# Patient Record
Sex: FEMALE | Race: WHITE | Employment: FULL TIME | URBAN - METROPOLITAN AREA
[De-identification: names, ages, dates, MRNs, and addresses within clinical notes are randomized per-mention and may not be internally consistent; named-entity substitution may affect disease eponyms.]

---

## 2017-01-10 ENCOUNTER — OFFICE VISIT (OUTPATIENT)
Dept: FAMILY MEDICINE CLINIC | Facility: CLINIC | Age: 32
End: 2017-01-10

## 2017-01-10 VITALS
HEART RATE: 82 BPM | DIASTOLIC BLOOD PRESSURE: 68 MMHG | RESPIRATION RATE: 14 BRPM | SYSTOLIC BLOOD PRESSURE: 118 MMHG | TEMPERATURE: 98 F | OXYGEN SATURATION: 98 %

## 2017-01-10 DIAGNOSIS — H93.11 TINNITUS, RIGHT: ICD-10-CM

## 2017-01-10 DIAGNOSIS — H65.191 ACUTE MIDDLE EAR EFFUSION, RIGHT: Primary | ICD-10-CM

## 2017-01-10 PROCEDURE — 99213 OFFICE O/P EST LOW 20 MIN: CPT | Performed by: PHYSICIAN ASSISTANT

## 2017-01-10 RX ORDER — FLUTICASONE PROPIONATE 50 MCG
SPRAY, SUSPENSION (ML) NASAL
Qty: 1 BOTTLE | Refills: 1 | Status: SHIPPED | OUTPATIENT
Start: 2017-01-10 | End: 2017-11-20 | Stop reason: ALTCHOICE

## 2017-01-11 NOTE — PATIENT INSTRUCTIONS
Eustachian tube problems  Written by the doctors and editors at [facility]     What is the eustachian tube? — The eustachian tube is a tube that connects the middle ear (the part of the ear behind the eardrum) to the back of the nose and throat       Normall Should I see a doctor or nurse? — See your doctor or nurse if your symptoms are severe, get worse, or if they don’t go away after a few days. Will I need tests? — Probably not.  Your doctor or nurse should be able to tell if you have a eustachian tube pr

## 2017-01-11 NOTE — PROGRESS NOTES
CHIEF COMPLAINT:   Patient presents with:  Ear Problem: muffled hearing, right ear      HPI:   Aki Rosado is a 33year old female who presents to clinic today with complaints of right ear discomfort. Has had for 1-2  weeks.  Discomfort described /68 mmHg  Pulse 82  Temp(Src) 98.2 °F (36.8 °C) (Oral)  Resp 14  SpO2 98%  LMP 12/27/2016 (Approximate)  GENERAL: well developed, well nourished,in no apparent distress  SKIN: no rashes,no suspicious lesions  HEAD: atraumatic, normocephalic  EYES: co What is the eustachian tube? — The eustachian tube is a tube that connects the middle ear (the part of the ear behind the eardrum) to the back of the nose and throat       Normally, the eustachian tube helps keep the air pressure inside the middle ear the Will I need tests? — Probably not.  Your doctor or nurse should be able to tell if you have a eustachian tube problem by learning about your symptoms and doing an exam.    If your symptoms are severe or last for a long time, your doctor or nurse might:    ?

## 2017-02-04 ENCOUNTER — OFFICE VISIT (OUTPATIENT)
Dept: OBGYN CLINIC | Facility: CLINIC | Age: 32
End: 2017-02-04

## 2017-02-04 VITALS
DIASTOLIC BLOOD PRESSURE: 60 MMHG | HEIGHT: 63.25 IN | HEART RATE: 81 BPM | SYSTOLIC BLOOD PRESSURE: 120 MMHG | WEIGHT: 158 LBS | BODY MASS INDEX: 27.65 KG/M2

## 2017-02-04 DIAGNOSIS — Z12.4 SCREENING FOR MALIGNANT NEOPLASM OF CERVIX: ICD-10-CM

## 2017-02-04 DIAGNOSIS — Z01.419 WELL FEMALE EXAM WITH ROUTINE GYNECOLOGICAL EXAM: Primary | ICD-10-CM

## 2017-02-04 PROCEDURE — 88175 CYTOPATH C/V AUTO FLUID REDO: CPT | Performed by: OBSTETRICS & GYNECOLOGY

## 2017-02-04 PROCEDURE — 99395 PREV VISIT EST AGE 18-39: CPT | Performed by: OBSTETRICS & GYNECOLOGY

## 2017-02-04 NOTE — PROGRESS NOTES
Annual  C/O some abdominal pain, mid cycle or so, intermittent  Some months worse than others, tolerable, does not last long  LMP 1/19/17, not sexually active.  Regular cycles  Had scan in 1675, small follicles, does not like being on OC    ROS: No Cardiac,

## 2017-02-07 LAB — PAP HISTORY (OTHER THAN LAST PAP): NORMAL

## 2017-02-07 NOTE — PROGRESS NOTES
Quick Note:    Left message on patients confidential voice mail Pap results are normal. To call back with any further concerns or questions.   ______

## 2017-08-18 ENCOUNTER — APPOINTMENT (OUTPATIENT)
Dept: OTHER | Facility: HOSPITAL | Age: 32
End: 2017-08-18
Attending: ORTHOPAEDIC SURGERY

## 2017-08-28 ENCOUNTER — TELEPHONE (OUTPATIENT)
Dept: FAMILY MEDICINE CLINIC | Facility: CLINIC | Age: 32
End: 2017-08-28

## 2017-08-28 NOTE — TELEPHONE ENCOUNTER
Attempted to contact pt to schedule appt, no answer, left message to contact the office. Forwarded to front staff in case pt calls back, since pt needs to schedule a follow up visit due to being overdue. No future appointments.     LOV: 3/19/16  Retur

## 2017-08-31 NOTE — TELEPHONE ENCOUNTER
Left detailed message on voicemail for patient to call the office and make an appt to follow up on her asthma, even if she is not having any asthma issues right now.

## 2017-11-20 ENCOUNTER — HOSPITAL ENCOUNTER (OUTPATIENT)
Age: 32
Discharge: HOME | End: 2017-11-20
Attending: EMERGENCY MEDICINE
Payer: COMMERCIAL

## 2017-11-20 VITALS
RESPIRATION RATE: 20 BRPM | BODY MASS INDEX: 27 KG/M2 | HEART RATE: 90 BPM | OXYGEN SATURATION: 98 % | DIASTOLIC BLOOD PRESSURE: 67 MMHG | WEIGHT: 155 LBS | TEMPERATURE: 99 F | SYSTOLIC BLOOD PRESSURE: 121 MMHG

## 2017-11-20 DIAGNOSIS — J45.901 MILD ASTHMA WITH EXACERBATION, UNSPECIFIED WHETHER PERSISTENT: ICD-10-CM

## 2017-11-20 DIAGNOSIS — J01.90 ACUTE SINUSITIS, RECURRENCE NOT SPECIFIED, UNSPECIFIED LOCATION: Primary | ICD-10-CM

## 2017-11-20 PROCEDURE — 99213 OFFICE O/P EST LOW 20 MIN: CPT

## 2017-11-20 PROCEDURE — 99214 OFFICE O/P EST MOD 30 MIN: CPT

## 2017-11-20 RX ORDER — ALBUTEROL SULFATE 90 UG/1
2 AEROSOL, METERED RESPIRATORY (INHALATION) EVERY 4 HOURS PRN
Qty: 1 INHALER | Refills: 0 | Status: SHIPPED | OUTPATIENT
Start: 2017-11-20 | End: 2017-12-20

## 2017-11-20 RX ORDER — PREDNISONE 20 MG/1
40 TABLET ORAL DAILY
Qty: 10 TABLET | Refills: 0 | Status: SHIPPED | OUTPATIENT
Start: 2017-11-20 | End: 2017-11-25

## 2017-11-20 RX ORDER — AMOXICILLIN AND CLAVULANATE POTASSIUM 875; 125 MG/1; MG/1
1 TABLET, FILM COATED ORAL 2 TIMES DAILY
Qty: 14 TABLET | Refills: 0 | Status: SHIPPED | OUTPATIENT
Start: 2017-11-20 | End: 2017-11-27

## 2017-11-20 NOTE — ED PROVIDER NOTES
Patient Seen in: [facility] In [address] [place] [address]    History   Patient presents with:  Cough/URI    Stated Complaint: cough     HPI    Patient is a 70-year-old female with a history of asthma who states she has had some sinus congestion and p Exam   ED Triage Vitals [11/20/17 9600]  BP: 121/67  Pulse: 90  Resp: 20  Temp: 98.9 °F (37.2 °C)  Temp src: Oral  SpO2: 98 %  O2 Device: None (Room air)    Current:/67   Pulse 90   Temp 98.9 °F (37.2 °C) (Oral)   Resp 20   Wt 70.3 kg   SpO2 98%   BM Differential Diagnosis/ Diagnostic Considerations: URI symptoms for greater than 1 month complicating her asthma. No evidence of wheezing presently and no hypoxia.     Medical Record Review: I personally reviewed available prior medical records for any

## 2017-12-08 ENCOUNTER — TELEPHONE (OUTPATIENT)
Dept: FAMILY MEDICINE CLINIC | Facility: CLINIC | Age: 32
End: 2017-12-08

## 2017-12-08 NOTE — TELEPHONE ENCOUNTER
Spoke to patient and reschedule for     Future Appointments  Date Time Provider [facility]   12/9/2017 10:30 AM Padmini Rodríguez,  [facility] [address] [facility] [address]

## 2017-12-09 ENCOUNTER — OFFICE VISIT (OUTPATIENT)
Dept: FAMILY MEDICINE CLINIC | Facility: CLINIC | Age: 32
End: 2017-12-09

## 2017-12-09 VITALS
RESPIRATION RATE: 16 BRPM | TEMPERATURE: 98 F | HEART RATE: 85 BPM | WEIGHT: 159.38 LBS | DIASTOLIC BLOOD PRESSURE: 60 MMHG | SYSTOLIC BLOOD PRESSURE: 110 MMHG | BODY MASS INDEX: 27.89 KG/M2 | OXYGEN SATURATION: 100 % | HEIGHT: 63.25 IN

## 2017-12-09 DIAGNOSIS — Z28.21 INFLUENZA VACCINATION DECLINED BY PATIENT: ICD-10-CM

## 2017-12-09 DIAGNOSIS — J45.41 MODERATE PERSISTENT ASTHMA WITH ACUTE EXACERBATION: Primary | ICD-10-CM

## 2017-12-09 PROCEDURE — 99214 OFFICE O/P EST MOD 30 MIN: CPT | Performed by: FAMILY MEDICINE

## 2017-12-09 NOTE — PATIENT INSTRUCTIONS
Asthma Trigger Checklist  Allergens, irritants, and other things may trigger your asthma. Check the box next to each of your triggers. After each trigger is a list of ways to avoid it.     Dust mites.  Dust mites live in mattresses, bedding, carpets, hank · Remove garbage from your home daily. · Store food in tightly sealed containers. Wash dishes as soon as they are used. · Use bait stations or traps to control roaches. Avoid using chemical sprays.      Smoke.  Smoke may be from cigarettes, cigars, pipes, · Watch for very high or low temperatures, very humid conditions, or a lot of wind, as these conditions can make symptoms worse. · Limit outdoor activity during the type of weather that affects you.   · Wear a scarf over your mouth and nose in cold weather · Stop if you have any symptoms. Make sure you talk with your provider about these symptoms. Date Last Reviewed: 1/1/2017  © 9810-9490 [employer]. [address] [facility], [address] [place] [address]. All rights reserved.  This information is not inten

## 2017-12-09 NOTE — PROGRESS NOTES
HPI:   Jaret Guido is a 27year old female that presents for asthma follow up. Previously on albuterol only and used <1x per week or prior to exercise. Lately has needed rescue inhaler 2-3 x per day. Waking up nightly coughing.   She ha Grossly normal     ASSESSMENT AND PLAN:      1. Moderate persistent asthma with acute exacerbation  - Fluticasone Propionate HFA (FLOVENT HFA) 110 MCG/ACT Inhalation Aerosol; Inhale 2 puffs into the lungs 2 (two) times daily. Dispense: 1 Inhaler;  Refill:

## 2018-01-11 ENCOUNTER — TELEPHONE (OUTPATIENT)
Dept: FAMILY MEDICINE CLINIC | Facility: CLINIC | Age: 33
End: 2018-01-11

## 2018-01-11 NOTE — TELEPHONE ENCOUNTER
Patient was a no show for her appt today with Dr. Kiana Coughlin. Spoke to patient who states that her grandmother is in the hospital, and that she tried to call office this morning (prior to 830). Patient will call back to reschedule.

## 2019-02-28 ENCOUNTER — TELEPHONE (OUTPATIENT)
Dept: OBGYN CLINIC | Facility: CLINIC | Age: 34
End: 2019-02-28

## 2019-02-28 DIAGNOSIS — Z12.39 SCREENING FOR MALIGNANT NEOPLASM OF BREAST: Primary | ICD-10-CM

## 2019-02-28 DIAGNOSIS — Z80.3 FAMILY HISTORY OF MALIGNANT NEOPLASM OF BREAST: ICD-10-CM

## 2019-02-28 NOTE — TELEPHONE ENCOUNTER
Patient wants to get a mammogram ordered due to history. Can we put an order in before her Annual which is on March 25, 2019. Call patient and let her know.

## 2019-02-28 NOTE — TELEPHONE ENCOUNTER
Last OV: 2/4/17 with Dr. Armando Lei for annual  Last mammogram: none on file; patient is 26 yrs old. Family hx (MGM with breast CA)  Next appt: 3/25/19    Will route to Dr. Armando Lei for review and advice.    Notified patient that Dr. Armando Lei is out of office at present,

## 2019-03-11 NOTE — TELEPHONE ENCOUNTER
Dixie Lao MD   You; [facility] [place] Nurse 17 minutes ago (12:42 PM)      OK for mammogram if patient desires    Routing Comment      Screening mammogram ordered. Patient notified by phone.

## 2019-05-22 ENCOUNTER — TELEPHONE (OUTPATIENT)
Dept: FAMILY MEDICINE CLINIC | Facility: CLINIC | Age: 34
End: 2019-05-22

## 2019-05-22 DIAGNOSIS — E55.9 VITAMIN D DEFICIENCY: Primary | ICD-10-CM

## 2019-05-22 DIAGNOSIS — Z00.00 LABORATORY EXAMINATION ORDERED AS PART OF A ROUTINE GENERAL MEDICAL EXAMINATION: ICD-10-CM

## 2019-05-22 NOTE — TELEPHONE ENCOUNTER
Future Appointments   Date Time Provider [facility] Christian   5/30/2019 11:30 AM Valerio Rodríguez, DO [facility] [address] [facility] [address]   6/3/2019  5:00 PM DWAINE Casey [facility]     Patient has been notified via [employer] reminder her to have her

## 2019-06-07 ENCOUNTER — LAB ENCOUNTER (OUTPATIENT)
Dept: LAB | Age: 34
End: 2019-06-07
Attending: FAMILY MEDICINE
Payer: COMMERCIAL

## 2019-06-07 ENCOUNTER — OFFICE VISIT (OUTPATIENT)
Dept: FAMILY MEDICINE CLINIC | Facility: CLINIC | Age: 34
End: 2019-06-07
Payer: COMMERCIAL

## 2019-06-07 VITALS
DIASTOLIC BLOOD PRESSURE: 60 MMHG | TEMPERATURE: 99 F | WEIGHT: 143 LBS | OXYGEN SATURATION: 100 % | HEIGHT: 63.25 IN | HEART RATE: 71 BPM | BODY MASS INDEX: 25.02 KG/M2 | RESPIRATION RATE: 16 BRPM | SYSTOLIC BLOOD PRESSURE: 100 MMHG

## 2019-06-07 DIAGNOSIS — Z00.00 LABORATORY EXAMINATION ORDERED AS PART OF A ROUTINE GENERAL MEDICAL EXAMINATION: ICD-10-CM

## 2019-06-07 DIAGNOSIS — H91.91 DECREASED HEARING, RIGHT: ICD-10-CM

## 2019-06-07 DIAGNOSIS — J45.20 MILD INTERMITTENT ASTHMA WITHOUT COMPLICATION: ICD-10-CM

## 2019-06-07 DIAGNOSIS — Z00.00 ANNUAL PHYSICAL EXAM: Primary | ICD-10-CM

## 2019-06-07 DIAGNOSIS — Z13.31 NEGATIVE DEPRESSION SCREENING: ICD-10-CM

## 2019-06-07 DIAGNOSIS — E55.9 VITAMIN D DEFICIENCY: ICD-10-CM

## 2019-06-07 PROCEDURE — 84443 ASSAY THYROID STIM HORMONE: CPT

## 2019-06-07 PROCEDURE — 80061 LIPID PANEL: CPT

## 2019-06-07 PROCEDURE — 99395 PREV VISIT EST AGE 18-39: CPT | Performed by: FAMILY MEDICINE

## 2019-06-07 PROCEDURE — 36415 COLL VENOUS BLD VENIPUNCTURE: CPT

## 2019-06-07 PROCEDURE — 80053 COMPREHEN METABOLIC PANEL: CPT

## 2019-06-07 PROCEDURE — 85025 COMPLETE CBC W/AUTO DIFF WBC: CPT

## 2019-06-07 PROCEDURE — 82306 VITAMIN D 25 HYDROXY: CPT

## 2019-06-07 RX ORDER — ALBUTEROL SULFATE 90 UG/1
2 AEROSOL, METERED RESPIRATORY (INHALATION) EVERY 6 HOURS PRN
COMMUNITY

## 2019-06-07 NOTE — PATIENT INSTRUCTIONS
Thank you for allowing me to participate in your care today. I will contact you with any results from today's visit. Lab results are typically available in 2-3 days for blood tests, and 3-5 days for any cultures or Paps.    Please let me know if you hav prediabetes All women with no symptoms who are overweight or obese and have 1 or more other risk factors for diabetes At least every 3 years.  Also, testing for diabetes during pregnancy after the 24th week.    Type 2 diabetes, prediabetes All women diagnos months after the first dose and the third dose given 6 months after the first dose   Influenza (flu) All women in this age group Once a year   Measles, mumps, rubella (MMR) All women in this age group who have no record of these infections or vaccines 1 or not up-to-date on their childhood vaccines should get all appropriate catch-up vaccines recommended by the CDC. Date Last Reviewed: 10/1/2017  © 2360-2288 [employer]. 1407 [facility], [facility] [place]. All rights reserved.  This info

## 2019-06-07 NOTE — PROGRESS NOTES
Estrella Ramirez is a 26year old female that presents for annual physical exam.     Last Pap: 2/4/17 -> WNL - Dr. Dina Wu  Hx of abnormal pap: Yes  STI testing desired: No  Mammogram: N/A  Colonoscopy: N/A  PHQ2: 0  Vaccines: declines vaccine  D 2007   • Hip surgery  12/2013    fractured pelvis in 3 spots   • Knee surgery  2002    reconstructive after accident   • Other surgical history      rt. hip fracture     Family History   Problem Relation Age of Onset   • High Cholesterol Father    • Diabet Blood Transfusions: Not Asked        Caffeine Concern: Yes          3 weekly        Occupational Exposure: Not Asked        Hobby Hazards: Not Asked        Sleep Concern: Not Asked        Stress Concern: Not Asked        Weight Concern: Not Asked        S clubbing or LE edema  NEURO: Oriented times three, gait stable, motor and sensory are grossly intact      Wt Readings from Last 6 Encounters:  06/07/19 : 143 lb  12/09/17 : 159 lb 6 oz  11/20/17 : 155 lb  02/04/17 : 158 lb  03/19/16 : 154 lb  02/09/16 : 15

## 2019-09-05 ENCOUNTER — HOSPITAL ENCOUNTER (OUTPATIENT)
Dept: MAMMOGRAPHY | Age: 34
Discharge: HOME | End: 2019-09-05
Attending: OBSTETRICS & GYNECOLOGY
Payer: COMMERCIAL

## 2019-09-05 DIAGNOSIS — Z80.3 FAMILY HISTORY OF MALIGNANT NEOPLASM OF BREAST: ICD-10-CM

## 2019-09-05 DIAGNOSIS — Z12.39 SCREENING FOR MALIGNANT NEOPLASM OF BREAST: ICD-10-CM

## 2019-09-05 PROCEDURE — 77063 BREAST TOMOSYNTHESIS BI: CPT | Performed by: OBSTETRICS & GYNECOLOGY

## 2019-09-05 PROCEDURE — 77067 SCR MAMMO BI INCL CAD: CPT | Performed by: OBSTETRICS & GYNECOLOGY

## 2019-09-09 ENCOUNTER — TELEPHONE (OUTPATIENT)
Dept: OBGYN CLINIC | Facility: CLINIC | Age: 34
End: 2019-09-09

## 2019-09-09 NOTE — TELEPHONE ENCOUNTER
Patient informed of normal mammogram results. Verbalized understanding. No further questions or concerns.

## 2019-09-09 NOTE — TELEPHONE ENCOUNTER
Routed to Tory Lundberg. Please review patient mammogram results and advise. Pt last saw Dr. Michelle Garber in 2017 and has appt with [facility] next week.

## 2019-09-16 ENCOUNTER — OFFICE VISIT (OUTPATIENT)
Dept: OBGYN CLINIC | Facility: CLINIC | Age: 34
End: 2019-09-16
Payer: COMMERCIAL

## 2019-09-16 VITALS
HEIGHT: 63.25 IN | SYSTOLIC BLOOD PRESSURE: 94 MMHG | BODY MASS INDEX: 25.52 KG/M2 | WEIGHT: 145.81 LBS | HEART RATE: 81 BPM | DIASTOLIC BLOOD PRESSURE: 60 MMHG

## 2019-09-16 DIAGNOSIS — Z01.419 WELL WOMAN EXAM WITH ROUTINE GYNECOLOGICAL EXAM: Primary | ICD-10-CM

## 2019-09-16 DIAGNOSIS — Z12.39 SCREENING FOR MALIGNANT NEOPLASM OF BREAST: ICD-10-CM

## 2019-09-16 DIAGNOSIS — N89.8 VAGINAL ODOR: ICD-10-CM

## 2019-09-16 PROCEDURE — 87480 CANDIDA DNA DIR PROBE: CPT | Performed by: NURSE PRACTITIONER

## 2019-09-16 PROCEDURE — 87660 TRICHOMONAS VAGIN DIR PROBE: CPT | Performed by: NURSE PRACTITIONER

## 2019-09-16 PROCEDURE — 87510 GARDNER VAG DNA DIR PROBE: CPT | Performed by: NURSE PRACTITIONER

## 2019-09-16 PROCEDURE — 99395 PREV VISIT EST AGE 18-39: CPT | Performed by: NURSE PRACTITIONER

## 2019-09-16 NOTE — PROGRESS NOTES
HPI:   Sandi Peguero is a 26year old  who presents for an annual gynecological exam.   Pt feels like she has a \"bad\" vaginal odor present. Denies using douches or feminine wash, but still the odor is present.  Denies abn discharge Cancer Maternal Aunt       Social History:   Social History    Tobacco Use      Smoking status: Never Smoker      Smokeless tobacco: Never Used    Alcohol use: No      Alcohol/week: 0.0 standard drinks      Comment: socially    Drug use: No       REVIEW OF appropriate    DISCUSSED:  - I encouraged incorporating 4x weekly cardiovascular activity to maintain good physical health. - We discussed maintaining a healthy diet heavy in fruits and vegetables.   - I encouraged the pt to stay away from fast foods and f

## 2020-02-24 ENCOUNTER — TELEPHONE (OUTPATIENT)
Dept: OBGYN CLINIC | Facility: CLINIC | Age: 35
End: 2020-02-24

## 2020-02-24 RX ORDER — ALBUTEROL SULFATE 90 UG/1
2 AEROSOL, METERED RESPIRATORY (INHALATION) EVERY 4 HOURS PRN
Qty: 1 INHALER | Refills: 0 | Status: SHIPPED | OUTPATIENT
Start: 2020-02-24

## 2020-02-24 NOTE — TELEPHONE ENCOUNTER
PT relates that her asthma hasnt acted up in a long time but now it is and she does not have a PCP. Would it be possible to have [facility] call one in for her.   Please advise

## 2020-02-24 NOTE — TELEPHONE ENCOUNTER
PC with patient. Aware provider to do 1 refill on inhaler. If having shortness of breathe, chest pain or inability to take deep breaths, she needs to got to the ER. Asked if she wants names for pcp She states she will check into herself.

## 2020-02-24 NOTE — TELEPHONE ENCOUNTER
PC with patient. Having some trouble with her asthma. Some difficulties with breathing due to asthma. Her inhaler has  and she is asking for a refill. Told she should get a pcp to follow her on this. I will check with provider.

## 2020-02-24 NOTE — TELEPHONE ENCOUNTER
Okay for 1 inhaler, but needs to find a PCP. Can recommend some if she'd like. If SOB, chest pain or inability to take deep breaths, need to go directly to ED.

## 2020-10-05 ENCOUNTER — TELEPHONE (OUTPATIENT)
Dept: OBGYN CLINIC | Facility: CLINIC | Age: 35
End: 2020-10-05

## 2020-10-05 DIAGNOSIS — R92.2 DENSE BREAST: Primary | ICD-10-CM

## 2020-10-05 NOTE — TELEPHONE ENCOUNTER
Pt last annual 9/16/19. Pt has mammogram scheduled for tomorrow; requesting bilateral whole breast US. This was ordered in 2019 by Dr. Antoni Harmon per mammography recommendations but patient did not complete. US ordered.     Pt reports itching in right axillae

## 2020-10-06 ENCOUNTER — HOSPITAL ENCOUNTER (OUTPATIENT)
Dept: MAMMOGRAPHY | Facility: HOSPITAL | Age: 35
Discharge: HOME | End: 2020-10-06
Attending: OBSTETRICS & GYNECOLOGY
Payer: COMMERCIAL

## 2020-10-06 DIAGNOSIS — Z12.31 ENCOUNTER FOR SCREENING MAMMOGRAM FOR MALIGNANT NEOPLASM OF BREAST: ICD-10-CM

## 2020-10-06 PROCEDURE — 77067 SCR MAMMO BI INCL CAD: CPT | Performed by: OBSTETRICS & GYNECOLOGY

## 2020-10-06 PROCEDURE — 77063 BREAST TOMOSYNTHESIS BI: CPT | Performed by: OBSTETRICS & GYNECOLOGY

## 2020-11-17 ENCOUNTER — OFFICE VISIT (OUTPATIENT)
Dept: OBGYN CLINIC | Facility: CLINIC | Age: 35
End: 2020-11-17
Payer: COMMERCIAL

## 2020-11-17 VITALS
BODY MASS INDEX: 26.75 KG/M2 | WEIGHT: 151 LBS | SYSTOLIC BLOOD PRESSURE: 114 MMHG | HEIGHT: 63 IN | DIASTOLIC BLOOD PRESSURE: 76 MMHG

## 2020-11-17 DIAGNOSIS — Z01.419 WELL WOMAN EXAM WITH ROUTINE GYNECOLOGICAL EXAM: Primary | ICD-10-CM

## 2020-11-17 DIAGNOSIS — Z12.4 SCREENING FOR MALIGNANT NEOPLASM OF CERVIX: ICD-10-CM

## 2020-11-17 DIAGNOSIS — Z23 NEED FOR VACCINATION: ICD-10-CM

## 2020-11-17 PROCEDURE — 3008F BODY MASS INDEX DOCD: CPT | Performed by: OBSTETRICS & GYNECOLOGY

## 2020-11-17 PROCEDURE — 3074F SYST BP LT 130 MM HG: CPT | Performed by: OBSTETRICS & GYNECOLOGY

## 2020-11-17 PROCEDURE — 90471 IMMUNIZATION ADMIN: CPT | Performed by: OBSTETRICS & GYNECOLOGY

## 2020-11-17 PROCEDURE — 88175 CYTOPATH C/V AUTO FLUID REDO: CPT | Performed by: OBSTETRICS & GYNECOLOGY

## 2020-11-17 PROCEDURE — 3078F DIAST BP <80 MM HG: CPT | Performed by: OBSTETRICS & GYNECOLOGY

## 2020-11-17 PROCEDURE — 90686 IIV4 VACC NO PRSV 0.5 ML IM: CPT | Performed by: OBSTETRICS & GYNECOLOGY

## 2020-11-17 PROCEDURE — 99395 PREV VISIT EST AGE 18-39: CPT | Performed by: OBSTETRICS & GYNECOLOGY

## 2020-11-17 NOTE — PROGRESS NOTES
Annual  No C/O  Menses every 21 days, bleeds three days, but she feels exhausted during/after her periods  Not sexually active  Did not like OC's    ROS: No Cardiac, Respiratory, GI,  or Neurological symptoms.     Had right axillary rash, itching, mammogr

## 2022-06-24 ENCOUNTER — APPOINTMENT (OUTPATIENT)
Dept: GENERAL RADIOLOGY | Age: 37
End: 2022-06-24
Attending: INTERNAL MEDICINE

## 2024-09-05 DIAGNOSIS — Z12.31 VISIT FOR SCREENING MAMMOGRAM: Primary | ICD-10-CM

## 2024-09-09 ENCOUNTER — APPOINTMENT (OUTPATIENT)
Dept: ULTRASOUND IMAGING | Age: 39
End: 2024-09-09

## (undated) NOTE — LETTER
ASTHMA ACTION PLAN for Rey Velasco     : 1985     Date: 2017  Provider:  Hamilton Connolly DO  Phone for doctor or clinic: [address] [facility], [address] [facility] , [address]

## (undated) NOTE — MR AVS SNAPSHOT
[address] [place] [address] Eyal Smiley  [phone]               Thank you for choosing us for your health care visit with Tiny Munson PA-C. We are glad to serve you and happy to provide you with this summary of your visit.   P ?Illnesses or conditions that make the eustachian tubes swollen or inflamed – These include colds, allergies, ear infections, or sinus infections. The sinuses are hollow areas in the bones of the face.   ?Sudden air pressure changes – Sudden air pressure ch ?Decongestants – These medicines can help with stuffy nose symptoms. These should not be used if patient has underlying blood pressure issues (high blood pressure etc.)   -Sudafed (pseudoephedrine)- according to package instructions. ? Antibiotic medici Where to Get Your Medications      These medications were sent to [facility] [phone], [address] [place] [address] [address] [address] [facility] [address]     Phone:  [phone]    - Fluticasone Propionate 50 MCG/ACT Susp Start activities slowly and build up over time Do what you like   Get your heart pumping – brisk walking, biking, swimming Even 10 minute increments are effective and add up over the week   2 ½ hours per week – spread out over time Use a rene to keep you

## (undated) NOTE — MR AVS SNAPSHOT
[address], [facility] [address] [place] [id number] [phone]               Thank you for choosing us for your health care visit with Oziel Cary MD.  We are glad to serve you and happy to provide you with this summary Assoc Dx:  Screening for malignant neoplasm of cervix [Z12.4]                 Follow-up Instructions     Return in about 1 year (around 2/4/2018).          MyChart     Visit [facility]  You can access your MyChart to more actively manage your health care and

## (undated) NOTE — LETTER
ASTHMA ACTION PLAN for Rey Velasco     : 1985     Date: 2019  Provider:  Stanley Aguilar DO  Phone for doctor or clinic: [address] [facility], [address] [facility] , [address] [facility] [address]

## (undated) NOTE — MR AVS SNAPSHOT
After Visit Summary   11/17/2020    Jeff Avery    MRN: [id number]           Visit Information     Date & Time  11/17/2020  4:30 PM Provider  [facility] [address] [facility]  [facility] now offers Video Visits through [facility] for adult and pediatric patients. Video Visits are available Monday - Friday for many common conditions such as allergies, colds, cough, fever, rash, sore throat, headache and pink eye.   The cost for a Video Vi [address]   Monday – Friday  4:00 pm – 10:00 pm   Saturday – Sunday  10:00 am – 4:00 pm  WALK-IN CARE  Emergency Medicine Providers  Conditions needing urgent attention, but are   non-life-threatening.     Also available by appointment Average cost  $120*